# Patient Record
Sex: MALE | Race: WHITE | NOT HISPANIC OR LATINO | Employment: FULL TIME | URBAN - METROPOLITAN AREA
[De-identification: names, ages, dates, MRNs, and addresses within clinical notes are randomized per-mention and may not be internally consistent; named-entity substitution may affect disease eponyms.]

---

## 2024-01-03 PROBLEM — E78.5 BORDERLINE HYPERLIPIDEMIA: Status: ACTIVE | Noted: 2024-01-03

## 2024-01-03 PROBLEM — G47.33 OSA ON CPAP: Status: ACTIVE | Noted: 2024-01-03

## 2024-01-03 PROBLEM — K76.0 FATTY INFILTRATION OF LIVER: Status: ACTIVE | Noted: 2024-01-03

## 2024-01-03 PROBLEM — J30.9 ALLERGIC RHINITIS: Status: ACTIVE | Noted: 2024-01-03

## 2024-01-03 PROBLEM — E66.01 CLASS 2 SEVERE OBESITY WITH BODY MASS INDEX (BMI) OF 35 TO 39.9 WITH SERIOUS COMORBIDITY (MULTI): Status: ACTIVE | Noted: 2024-01-03

## 2024-01-03 PROBLEM — K21.9 GERD (GASTROESOPHAGEAL REFLUX DISEASE): Status: ACTIVE | Noted: 2024-01-03

## 2024-01-03 PROBLEM — F41.9 ANXIETY: Status: ACTIVE | Noted: 2024-01-03

## 2024-01-03 PROBLEM — G56.20 ULNAR NEUROPATHY: Status: ACTIVE | Noted: 2024-01-03

## 2024-01-03 PROBLEM — K22.10 ESOPHAGEAL ULCER: Status: ACTIVE | Noted: 2024-01-03

## 2024-01-03 PROBLEM — E66.812 CLASS 2 SEVERE OBESITY WITH BODY MASS INDEX (BMI) OF 35 TO 39.9 WITH SERIOUS COMORBIDITY: Status: ACTIVE | Noted: 2024-01-03

## 2024-01-03 RX ORDER — OMEPRAZOLE 20 MG/1
CAPSULE, DELAYED RELEASE ORAL
COMMUNITY
Start: 2017-12-18

## 2024-01-03 RX ORDER — FAMOTIDINE 20 MG/1
1 TABLET, FILM COATED ORAL NIGHTLY
COMMUNITY
Start: 2022-01-24 | End: 2024-03-14 | Stop reason: WASHOUT

## 2024-01-03 RX ORDER — CITALOPRAM 20 MG/1
1 TABLET, FILM COATED ORAL DAILY
COMMUNITY
Start: 2014-11-20

## 2024-01-11 ENCOUNTER — OFFICE VISIT (OUTPATIENT)
Dept: PRIMARY CARE | Facility: CLINIC | Age: 54
End: 2024-01-11
Payer: COMMERCIAL

## 2024-01-11 ENCOUNTER — LAB (OUTPATIENT)
Dept: LAB | Facility: LAB | Age: 54
End: 2024-01-11
Payer: COMMERCIAL

## 2024-01-11 VITALS
HEIGHT: 71 IN | RESPIRATION RATE: 16 BRPM | SYSTOLIC BLOOD PRESSURE: 124 MMHG | DIASTOLIC BLOOD PRESSURE: 78 MMHG | TEMPERATURE: 97.7 F | HEART RATE: 70 BPM | WEIGHT: 270.2 LBS | OXYGEN SATURATION: 96 % | BODY MASS INDEX: 37.83 KG/M2

## 2024-01-11 DIAGNOSIS — K76.0 FATTY INFILTRATION OF LIVER: ICD-10-CM

## 2024-01-11 DIAGNOSIS — Z12.5 SCREENING FOR PROSTATE CANCER: ICD-10-CM

## 2024-01-11 DIAGNOSIS — G47.33 OSA ON CPAP: ICD-10-CM

## 2024-01-11 DIAGNOSIS — Z23 NEED FOR TDAP VACCINATION: Primary | ICD-10-CM

## 2024-01-11 DIAGNOSIS — K21.9 GASTROESOPHAGEAL REFLUX DISEASE WITHOUT ESOPHAGITIS: ICD-10-CM

## 2024-01-11 DIAGNOSIS — E66.01 CLASS 2 SEVERE OBESITY WITH BODY MASS INDEX (BMI) OF 35 TO 39.9 WITH SERIOUS COMORBIDITY (MULTI): ICD-10-CM

## 2024-01-11 DIAGNOSIS — F41.9 ANXIETY: ICD-10-CM

## 2024-01-11 DIAGNOSIS — Z00.00 HEALTHCARE MAINTENANCE: ICD-10-CM

## 2024-01-11 DIAGNOSIS — R79.89 ELEVATED LIVER FUNCTION TESTS: ICD-10-CM

## 2024-01-11 LAB
ALBUMIN SERPL BCP-MCNC: 4.4 G/DL (ref 3.4–5)
ALP SERPL-CCNC: 43 U/L (ref 33–120)
ALT SERPL W P-5'-P-CCNC: 82 U/L (ref 10–52)
ANION GAP SERPL CALC-SCNC: 10 MMOL/L (ref 10–20)
APPEARANCE UR: CLEAR
AST SERPL W P-5'-P-CCNC: 50 U/L (ref 9–39)
BILIRUB SERPL-MCNC: 0.8 MG/DL (ref 0–1.2)
BILIRUB UR STRIP.AUTO-MCNC: NEGATIVE MG/DL
BUN SERPL-MCNC: 16 MG/DL (ref 6–23)
CALCIUM SERPL-MCNC: 9.3 MG/DL (ref 8.6–10.3)
CHLORIDE SERPL-SCNC: 103 MMOL/L (ref 98–107)
CHOLEST SERPL-MCNC: 176 MG/DL (ref 0–199)
CHOLESTEROL/HDL RATIO: 4.3
CO2 SERPL-SCNC: 30 MMOL/L (ref 21–32)
COLOR UR: YELLOW
CREAT SERPL-MCNC: 0.94 MG/DL (ref 0.5–1.3)
EGFRCR SERPLBLD CKD-EPI 2021: >90 ML/MIN/1.73M*2
ERYTHROCYTE [DISTWIDTH] IN BLOOD BY AUTOMATED COUNT: 13.1 % (ref 11.5–14.5)
GLUCOSE SERPL-MCNC: 103 MG/DL (ref 74–99)
GLUCOSE UR STRIP.AUTO-MCNC: NEGATIVE MG/DL
HCT VFR BLD AUTO: 43.7 % (ref 41–52)
HDLC SERPL-MCNC: 40.9 MG/DL
HGB BLD-MCNC: 14.5 G/DL (ref 13.5–17.5)
KETONES UR STRIP.AUTO-MCNC: NEGATIVE MG/DL
LDLC SERPL CALC-MCNC: 109 MG/DL
LEUKOCYTE ESTERASE UR QL STRIP.AUTO: NEGATIVE
MCH RBC QN AUTO: 29.6 PG (ref 26–34)
MCHC RBC AUTO-ENTMCNC: 33.2 G/DL (ref 32–36)
MCV RBC AUTO: 89 FL (ref 80–100)
NITRITE UR QL STRIP.AUTO: NEGATIVE
NON HDL CHOLESTEROL: 135 MG/DL (ref 0–149)
NRBC BLD-RTO: 0 /100 WBCS (ref 0–0)
PH UR STRIP.AUTO: 6 [PH]
PLATELET # BLD AUTO: 197 X10*3/UL (ref 150–450)
POTASSIUM SERPL-SCNC: 4.2 MMOL/L (ref 3.5–5.3)
PROT SERPL-MCNC: 7.1 G/DL (ref 6.4–8.2)
PROT UR STRIP.AUTO-MCNC: NEGATIVE MG/DL
PSA SERPL-MCNC: 0.24 NG/ML
RBC # BLD AUTO: 4.9 X10*6/UL (ref 4.5–5.9)
RBC # UR STRIP.AUTO: NEGATIVE /UL
SODIUM SERPL-SCNC: 139 MMOL/L (ref 136–145)
SP GR UR STRIP.AUTO: 1.03
TRIGL SERPL-MCNC: 132 MG/DL (ref 0–149)
TSH SERPL-ACNC: 0.99 MIU/L (ref 0.44–3.98)
UROBILINOGEN UR STRIP.AUTO-MCNC: <2 MG/DL
VLDL: 26 MG/DL (ref 0–40)
WBC # BLD AUTO: 4.6 X10*3/UL (ref 4.4–11.3)

## 2024-01-11 PROCEDURE — 81003 URINALYSIS AUTO W/O SCOPE: CPT

## 2024-01-11 PROCEDURE — 85027 COMPLETE CBC AUTOMATED: CPT

## 2024-01-11 PROCEDURE — 80061 LIPID PANEL: CPT

## 2024-01-11 PROCEDURE — 90715 TDAP VACCINE 7 YRS/> IM: CPT | Performed by: INTERNAL MEDICINE

## 2024-01-11 PROCEDURE — 84443 ASSAY THYROID STIM HORMONE: CPT

## 2024-01-11 PROCEDURE — 90471 IMMUNIZATION ADMIN: CPT | Performed by: INTERNAL MEDICINE

## 2024-01-11 PROCEDURE — 84153 ASSAY OF PSA TOTAL: CPT

## 2024-01-11 PROCEDURE — 80053 COMPREHEN METABOLIC PANEL: CPT

## 2024-01-11 PROCEDURE — 36415 COLL VENOUS BLD VENIPUNCTURE: CPT

## 2024-01-11 PROCEDURE — 99396 PREV VISIT EST AGE 40-64: CPT | Performed by: INTERNAL MEDICINE

## 2024-01-11 PROCEDURE — 1036F TOBACCO NON-USER: CPT | Performed by: INTERNAL MEDICINE

## 2024-01-11 ASSESSMENT — ENCOUNTER SYMPTOMS
VOICE CHANGE: 0
RESPIRATORY NEGATIVE: 1
SEIZURES: 0
ARTHRALGIAS: 0
NECK STIFFNESS: 0
BACK PAIN: 0
HALLUCINATIONS: 0
HEMATOLOGIC/LYMPHATIC NEGATIVE: 1
EYE PAIN: 0
ALLERGIC/IMMUNOLOGIC NEGATIVE: 1
MYALGIAS: 0
SHORTNESS OF BREATH: 0
ABDOMINAL PAIN: 0
WOUND: 0
WEAKNESS: 0
WHEEZING: 0
POLYDIPSIA: 0
SPEECH DIFFICULTY: 0
ENDOCRINE NEGATIVE: 1
POLYPHAGIA: 0
DIZZINESS: 0
COLOR CHANGE: 0
ADENOPATHY: 0
FACIAL ASYMMETRY: 0
EYE DISCHARGE: 0
CONSTITUTIONAL NEGATIVE: 1
EYES NEGATIVE: 1
CARDIOVASCULAR NEGATIVE: 1
EYE REDNESS: 0
MUSCULOSKELETAL NEGATIVE: 1
NECK PAIN: 0
LIGHT-HEADEDNESS: 0
OCCASIONAL FEELINGS OF UNSTEADINESS: 0
VOMITING: 0
HEADACHES: 0
AGITATION: 0
DIARRHEA: 0
BLOOD IN STOOL: 0
PSYCHIATRIC NEGATIVE: 1
ACTIVITY CHANGE: 0
SINUS PRESSURE: 0
CONFUSION: 0
CHEST TIGHTNESS: 0
APPETITE CHANGE: 0
DIFFICULTY URINATING: 0
DYSURIA: 0
SORE THROAT: 0
CONSTIPATION: 0
LOSS OF SENSATION IN FEET: 0
NAUSEA: 0
SINUS PAIN: 0
SLEEP DISTURBANCE: 0
PALPITATIONS: 0
TREMORS: 0
JOINT SWELLING: 0
NEUROLOGICAL NEGATIVE: 1
DEPRESSION: 0
GASTROINTESTINAL NEGATIVE: 1
NUMBNESS: 0
FREQUENCY: 0
FLANK PAIN: 0
TROUBLE SWALLOWING: 0
BRUISES/BLEEDS EASILY: 0
STRIDOR: 0
UNEXPECTED WEIGHT CHANGE: 0
COUGH: 0
NERVOUS/ANXIOUS: 0

## 2024-01-11 ASSESSMENT — PATIENT HEALTH QUESTIONNAIRE - PHQ9
SUM OF ALL RESPONSES TO PHQ9 QUESTIONS 1 AND 2: 0
1. LITTLE INTEREST OR PLEASURE IN DOING THINGS: NOT AT ALL
2. FEELING DOWN, DEPRESSED OR HOPELESS: NOT AT ALL

## 2024-01-11 NOTE — PROGRESS NOTES
Subjective   Patient ID: Barrera Ruiz is a 53 y.o. male who presents for Annual Exam (Patient is here for a physical.).  HPI    Patient has been feeling pretty good and has been complaint with prescribed medications.    We reviewed and discussed details of recent blood work: CBC, CMP, TSH, Lipid panel, Hb A1c, Vit D, PSA done in 2023.  Results within acceptable range except elevated liver enzymes and borderline elevated  LDL..    Patient was previously diagnosed with NAFLD, saw hepatologist Dr. Vizcarra.  Last US abdomen was in 2016.    He has been using CPAP machine, received new one I 2023, follows with sleep medicine.  Anxiety sx controlled with citalopram  GERD sx controlled with omeprazole    Colonoscopy: 2020, next 2030      Review of Systems   Constitutional: Negative.  Negative for activity change, appetite change and unexpected weight change.   HENT: Negative.  Negative for congestion, ear discharge, ear pain, hearing loss, mouth sores, nosebleeds, sinus pressure, sinus pain, sore throat, trouble swallowing and voice change.    Eyes: Negative.  Negative for pain, discharge, redness and visual disturbance.   Respiratory: Negative.  Negative for cough, chest tightness, shortness of breath, wheezing and stridor.    Cardiovascular: Negative.  Negative for chest pain, palpitations and leg swelling.   Gastrointestinal: Negative.  Negative for abdominal pain, blood in stool, constipation, diarrhea, nausea and vomiting.   Endocrine: Negative.  Negative for polydipsia, polyphagia and polyuria.   Genitourinary: Negative.  Negative for difficulty urinating, dysuria, flank pain, frequency and urgency.   Musculoskeletal: Negative.  Negative for arthralgias, back pain, gait problem, joint swelling, myalgias, neck pain and neck stiffness.   Skin: Negative.  Negative for color change, rash and wound.   Allergic/Immunologic: Negative.  Negative for environmental allergies, food allergies and immunocompromised state.  "  Neurological: Negative.  Negative for dizziness, tremors, seizures, syncope, facial asymmetry, speech difficulty, weakness, light-headedness, numbness and headaches.   Hematological: Negative.  Negative for adenopathy. Does not bruise/bleed easily.   Psychiatric/Behavioral: Negative.  Negative for agitation, behavioral problems, confusion, hallucinations, sleep disturbance and suicidal ideas. The patient is not nervous/anxious.    All other systems reviewed and are negative.      Objective     Review of systems was performed and all systems were negative except what in HPI    /78   Pulse 70   Temp 36.5 °C (97.7 °F)   Resp 16   Ht 1.803 m (5' 11\")   Wt 123 kg (270 lb 3.2 oz)   SpO2 96%   BMI 37.69 kg/m²    Physical Exam  Vitals and nursing note reviewed.   Constitutional:       General: He is not in acute distress.     Appearance: Normal appearance.   HENT:      Head: Normocephalic and atraumatic.      Right Ear: Tympanic membrane, ear canal and external ear normal.      Left Ear: Tympanic membrane, ear canal and external ear normal.      Nose: Nose normal. No congestion or rhinorrhea.      Mouth/Throat:      Mouth: Mucous membranes are moist.      Pharynx: Oropharynx is clear.   Eyes:      General:         Right eye: No discharge.         Left eye: No discharge.      Extraocular Movements: Extraocular movements intact.      Conjunctiva/sclera: Conjunctivae normal.      Pupils: Pupils are equal, round, and reactive to light.   Cardiovascular:      Rate and Rhythm: Normal rate and regular rhythm.      Pulses: Normal pulses.      Heart sounds: Normal heart sounds. No murmur heard.     No friction rub. No gallop.   Pulmonary:      Effort: Pulmonary effort is normal. No respiratory distress.      Breath sounds: Normal breath sounds. No stridor. No wheezing, rhonchi or rales.   Chest:      Chest wall: No tenderness.   Abdominal:      General: Bowel sounds are normal.      Palpations: Abdomen is soft. There " is no mass.      Tenderness: There is no abdominal tenderness. There is no guarding or rebound.   Musculoskeletal:         General: No swelling or deformity. Normal range of motion.      Cervical back: Normal range of motion and neck supple. No rigidity or tenderness.      Right lower leg: No edema.      Left lower leg: No edema.   Lymphadenopathy:      Cervical: No cervical adenopathy.   Skin:     General: Skin is warm and dry.      Coloration: Skin is not jaundiced.      Findings: No bruising or erythema.   Neurological:      General: No focal deficit present.      Mental Status: He is alert and oriented to person, place, and time. Mental status is at baseline.      Cranial Nerves: No cranial nerve deficit.      Motor: No weakness.      Coordination: Coordination normal.      Gait: Gait normal.   Psychiatric:         Mood and Affect: Mood normal.         Behavior: Behavior normal.         Thought Content: Thought content normal.         Judgment: Judgment normal.         Assessment/Plan   Problem List Items Addressed This Visit             ICD-10-CM       Endocrine/Metabolic    Class 2 severe obesity with body mass index (BMI) of 35 to 39.9 with serious comorbidity (CMS/HCC) E66.01     Weight loss is advised. Target BMI: 25. Please follow low carbohydrate diet and exercise at least 150 minutes weekly.  Nutritional consultation is available, please let me know if interested.             Gastrointestinal and Abdominal    Fatty infiltration of liver K76.0     Weight loss is advised. Obtain blood work, US liver, F/up with your hepatologist Dr. Vizcarra.         GERD (gastroesophageal reflux disease) K21.9     Clinically stable. Continue Omeprazole.         Elevated liver function tests R79.89    Relevant Orders    US abdomen limited liver       Health Encounters    Healthcare maintenance Z00.00    Relevant Orders    CBC (Completed)    Comprehensive Metabolic Panel (Completed)    Lipid Panel (Completed)    Prostate  Specific Antigen, Screen (Completed)    TSH with reflex to Free T4 if abnormal (Completed)    Urinalysis with Reflex Microscopic (Completed)       Mental Health    Anxiety F41.9     Clinically stable. Continue current treatment.            Sleep    BARBI on CPAP G47.33     Clinically stable. Continue CPAP. F/up with sleep medicine.          Other Visit Diagnoses         Codes    Need for Tdap vaccination    -  Primary Z23    Relevant Orders    Tdap vaccine, age 7 years and older (Completed)    Screening for prostate cancer     Z12.5    Relevant Orders    Prostate Specific Antigen, Screen (Completed)        It was a pleasure to see you today.  I would like to remind you about importance of a healthy lifestyle in order to improve your well-being and live longer.  Try to engage in physical activities for at least 150 minutes per week.  Eat about 10 servings of fruits and vegetables daily. My advice is 2 servings of fruits and 8 servings of vegetables.  For vegetables choose at least half of them green and at least half of them fresh.  Please avoid sugar, salt, fried food and saturated fat.    F/up in 1 year or sooner if needed.

## 2024-01-17 ENCOUNTER — ANCILLARY PROCEDURE (OUTPATIENT)
Dept: RADIOLOGY | Facility: CLINIC | Age: 54
End: 2024-01-17
Payer: COMMERCIAL

## 2024-01-17 DIAGNOSIS — R79.89 ELEVATED LIVER FUNCTION TESTS: ICD-10-CM

## 2024-01-17 PROCEDURE — 76705 ECHO EXAM OF ABDOMEN: CPT | Performed by: RADIOLOGY

## 2024-01-17 PROCEDURE — 76705 ECHO EXAM OF ABDOMEN: CPT

## 2024-03-12 ENCOUNTER — OFFICE VISIT (OUTPATIENT)
Dept: GASTROENTEROLOGY | Facility: CLINIC | Age: 54
End: 2024-03-12
Payer: COMMERCIAL

## 2024-03-12 VITALS
WEIGHT: 277 LBS | TEMPERATURE: 98.1 F | BODY MASS INDEX: 38.63 KG/M2 | SYSTOLIC BLOOD PRESSURE: 126 MMHG | DIASTOLIC BLOOD PRESSURE: 75 MMHG | HEART RATE: 84 BPM

## 2024-03-12 DIAGNOSIS — R79.89 ELEVATED LIVER FUNCTION TESTS: ICD-10-CM

## 2024-03-12 DIAGNOSIS — K76.0 FATTY INFILTRATION OF LIVER: ICD-10-CM

## 2024-03-12 PROCEDURE — 1036F TOBACCO NON-USER: CPT | Performed by: INTERNAL MEDICINE

## 2024-03-12 PROCEDURE — 99214 OFFICE O/P EST MOD 30 MIN: CPT | Performed by: INTERNAL MEDICINE

## 2024-03-12 ASSESSMENT — PAIN SCALES - GENERAL: PAINLEVEL: 0-NO PAIN

## 2024-03-12 NOTE — PATIENT INSTRUCTIONS
[x]  Liver Elastography aka Fibroscan  Scheduling [phone] (Department Gastro)  Type of liver elastography.  A special ultrasound that measures scarring (fibrosis) and fat (steatosis) in the liver.   The Fibroscan is located at the following locations: [facility], [facility], [facility] (This test is not done in radiology)  YOU SHOULD NOT EAT OR DRINK 3 HOURS BEFORE THE EXAM.       At this time there are no medications to treat NAFLD, the treatment is related to the cause.  Lifestyle changes can help prevent damage and may reverse it in the early stages.  First line of treatment for overweight or obese person is weight loss, that is gradual and sustained.  Recommendation is 5-10% over 6-12 months. Physical activity is important in treating fatty liver. Further recommendation is regular exercise that includes resistance training.  This is an effective treatment (weight training/gym training) to help mobilize fat out of the liver.  Good nutrition that includes low sugar fresh fruits and low starch vegetables, and using healthy oils/fats for cooking.  Eat a lower carb diet by decreasing intake of added sugar, sugary beverages, high fructose corn syrup, and as much as possible avoiding foods made with white flour.  You should limit alcohol intake in NAFLD.  Keep cholesterol and blood sugar under control in those with diabetes and high cholesterol.  Eating healthy and exercising regularly may help prevent liver damage from starting or reverse it in the early stages.    What are Carbohydrates?  Come in a variety of forms, most common are sugar, fiber, and starches  Healthy carbohydrates: unprocessed or minimally processed foods, vegetables, some fruits, and beans  Unhealthy carbohydrates: sugar, white bread, pastries, soda, highly processed or refined foods.     Examples of Some Foods High in Carbohydrates  High starch foods  Grains (rice ,wheat, barley)  Corn  Bread, pasta, cereal  Potatoes and root  vegetables  Soda  Chips    Example of Some Fruits High in Sugar  Mangoes  Grapes  Cherries  Pears  Figs  Watermelon   Bananas  Candied fruit  Fruit juices  Canned Fruit  Dried fruits  Mangoes        If you have any questions or need assistance, please don't hesitate to contact us.   Dr. Vizcarra: Office [phone] Fax: [phone]  Hepatology Nurse Coordinator: Luda SANTOS [phone]

## 2024-03-14 ASSESSMENT — ENCOUNTER SYMPTOMS
FEVER: 0
RECTAL PAIN: 0
TROUBLE SWALLOWING: 0
NAUSEA: 0
BLOOD IN STOOL: 0
ABDOMINAL PAIN: 0
DIARRHEA: 0
COLOR CHANGE: 0
ANAL BLEEDING: 0
SHORTNESS OF BREATH: 0
CONSTIPATION: 0
UNEXPECTED WEIGHT CHANGE: 0
ABDOMINAL DISTENTION: 0
CHILLS: 0
VOMITING: 0

## 2024-03-14 NOTE — ASSESSMENT & PLAN NOTE
It is reasonable to repeat fibroscan at this point.  This patient has Metabolic Dysfunction Associated Steatotic Liver Disease (MASLD) without advanced fibrosis  diet and exercise were encouraged  Dietary and lifestyle interventions were recommended as follows:    Perform vigorous physical activity like brisk walking or structured gym exercises 3 times a week for 30 minutes or more frequently  Avoid foods which contain complex sugars like wheat, rice, potatoes and corn.  Avoid eating foods that are rich in sugar in excess such as high sugar content fruits (pineapple, emily...) and desserts, cakes and pies  Eat more good foods that are rich in good fat such as cold water fish (salmon, swordfish...) as well as avocados and peanut butter in moderation  Snack on nuts that are high in protein and good oils such as walnuts, almonds.   Eat a mediteranean diet which is rich in grilled lean meats, high protein beans and legumes and olive oil     Exercise for 30 minutes or more at least 3 times a week  focus on exercises that build muscle mass like working with weights and lifting. Try swimming or water aerobics if you have access to a pool    Aim to lose 7-10% of your total body weight over 6-12 months

## 2024-03-14 NOTE — PROGRESS NOTES
Subjective  He was seen in 2020 for elevated ALT and steatosis on imaging. Fibroscan showed F0-F1 fibrosis and severe steatosis. Not much has changed since then and he denies fluid overload or cognitive impairment.     Review of Systems   Constitutional:  Negative for chills, fever and unexpected weight change.   HENT:  Negative for trouble swallowing.    Respiratory:  Negative for shortness of breath.    Cardiovascular:  Negative for chest pain.   Gastrointestinal:  Negative for abdominal distention, abdominal pain, anal bleeding, blood in stool, constipation, diarrhea, nausea, rectal pain and vomiting.   Skin:  Negative for color change.       Physical Exam  Vitals reviewed.   Constitutional:       General: He is awake.      Appearance: Normal appearance.   HENT:      Head: Normocephalic and atraumatic.      Nose: Nose normal.      Mouth/Throat:      Mouth: Mucous membranes are moist.   Eyes:      Pupils: Pupils are equal, round, and reactive to light.   Cardiovascular:      Rate and Rhythm: Normal rate.   Pulmonary:      Effort: Pulmonary effort is normal.   Neurological:      Mental Status: He is alert and oriented to person, place, and time. Mental status is at baseline.   Psychiatric:         Attention and Perception: Attention and perception normal.         Mood and Affect: Mood normal.         Behavior: Behavior normal.     LABS:   Lab Results   Component Value Date    ALBUMIN 4.4 01/11/2024    BILITOT 0.8 01/11/2024    BILIDIR 0.1 01/21/2020    ALKPHOS 43 01/11/2024    ALT 82 (H) 01/11/2024    AST 50 (H) 01/11/2024    PROT 7.1 01/11/2024    [id number] 134 01/21/2020    MITOAB NEGATIVE 01/21/2020    SMOOTHMUSCAB NEGATIVE 01/21/2020    CERULOPLSM 23 01/21/2020    HAVTO NON-REACTIVE 01/21/2020    HEPBSAB < 3.1 01/21/2020    HEPBCAB NONREACTIVE 01/21/2020    HEPBSAG NONREACTIVE 01/21/2020    HEPCAB NON-REACTIVE 01/21/2020    INR 1.1 01/21/2020    FERRITIN 91 01/21/2020    IRON 124 01/21/2020    IRONSAT 33  "01/21/2020      Lab Results   Component Value Date    ALBUMIN 4.4 01/11/2024    BILITOT 0.8 01/11/2024    BILIDIR 0.1 01/21/2020    ALKPHOS 43 01/11/2024    ALT 82 (H) 01/11/2024    AST 50 (H) 01/11/2024    PROT 7.1 01/11/2024      Lab Results   Component Value Date    GLUCOSE 103 (H) 01/11/2024    CALCIUM 9.3 01/11/2024     01/11/2024    K 4.2 01/11/2024    CO2 30 01/11/2024     01/11/2024    BUN 16 01/11/2024    CREATININE 0.94 01/11/2024     Lab Results   Component Value Date    WBC 4.6 01/11/2024    HGB 14.5 01/11/2024    HCT 43.7 01/11/2024    MCV 89 01/11/2024     01/11/2024     Lab Results   Component Value Date    INR 1.1 01/21/2020      Lab Results   Component Value Date     01/11/2024    CREATININE 0.94 01/11/2024    BILITOT 0.8 01/11/2024    INR 1.1 01/21/2020     No results found for: \"AFP\"   Lab Results   Component Value Date    FERRITIN 91 01/21/2020    IRON 124 01/21/2020    IRONSAT 33 01/21/2020      Lab Results   Component Value Date    HEPCAB NON-REACTIVE 01/21/2020     Lab Results   Component Value Date    HEPBSAG NONREACTIVE 01/21/2020    HEPBSAB < 3.1 01/21/2020    HEPBCAB NONREACTIVE 01/21/2020      No results found for: \"SJUP869\", \"FZXS202\"     === 01/17/24 ===    US ABDOMEN LIMITED LIVER    - Impression -  Echogenic fatty infiltrated liver.    No gallstones.    MACRO:  None    Signed by: Wilian Ellis 1/17/2024 1:39 PM  Dictation workstation:   [id number]                 Fatty infiltration of liver  It is reasonable to repeat fibroscan at this point.  This patient has Metabolic Dysfunction Associated Steatotic Liver Disease (MASLD) without advanced fibrosis  diet and exercise were encouraged  Dietary and lifestyle interventions were recommended as follows:    Perform vigorous physical activity like brisk walking or structured gym exercises 3 times a week for 30 minutes or more frequently  Avoid foods which contain complex sugars like wheat, rice, potatoes and " corn.  Avoid eating foods that are rich in sugar in excess such as high sugar content fruits (pineapple, emily...) and desserts, cakes and pies  Eat more good foods that are rich in good fat such as cold water fish (salmon, swordfish...) as well as avocados and peanut butter in moderation  Snack on nuts that are high in protein and good oils such as walnuts, almonds.   Eat a mediteranean diet which is rich in grilled lean meats, high protein beans and legumes and olive oil     Exercise for 30 minutes or more at least 3 times a week  focus on exercises that build muscle mass like working with weights and lifting. Try swimming or water aerobics if you have access to a pool    Aim to lose 7-10% of your total body weight over 6-12 months

## 2024-04-12 ENCOUNTER — CLINICAL SUPPORT (OUTPATIENT)
Dept: GASTROENTEROLOGY | Facility: HOSPITAL | Age: 54
End: 2024-04-12
Payer: COMMERCIAL

## 2024-04-12 DIAGNOSIS — R79.89 ELEVATED LIVER FUNCTION TESTS: ICD-10-CM

## 2024-04-12 DIAGNOSIS — K76.0 FATTY INFILTRATION OF LIVER: ICD-10-CM

## 2024-04-12 PROCEDURE — 91200 LIVER ELASTOGRAPHY: CPT | Performed by: INTERNAL MEDICINE

## 2024-04-16 ENCOUNTER — TELEPHONE (OUTPATIENT)
Dept: GASTROENTEROLOGY | Facility: CLINIC | Age: 54
End: 2024-04-16
Payer: COMMERCIAL

## 2024-04-17 ENCOUNTER — DOCUMENTATION (OUTPATIENT)
Dept: GASTROENTEROLOGY | Facility: HOSPITAL | Age: 54
End: 2024-04-17
Payer: COMMERCIAL

## 2024-06-13 DIAGNOSIS — K21.9 GASTROESOPHAGEAL REFLUX DISEASE WITHOUT ESOPHAGITIS: ICD-10-CM

## 2024-06-13 DIAGNOSIS — F41.9 ANXIETY: Primary | ICD-10-CM

## 2024-06-13 RX ORDER — CITALOPRAM 20 MG/1
20 TABLET, FILM COATED ORAL DAILY
Qty: 90 TABLET | Refills: 2 | Status: SHIPPED | OUTPATIENT
Start: 2024-06-13

## 2024-06-13 RX ORDER — OMEPRAZOLE 20 MG/1
20 CAPSULE, DELAYED RELEASE ORAL
Qty: 90 CAPSULE | Refills: 2 | Status: SHIPPED | OUTPATIENT
Start: 2024-06-13

## 2024-06-20 ENCOUNTER — APPOINTMENT (OUTPATIENT)
Dept: SLEEP MEDICINE | Facility: CLINIC | Age: 54
End: 2024-06-20
Payer: COMMERCIAL

## 2024-08-16 DIAGNOSIS — K40.90 INGUINAL HERNIA WITHOUT OBSTRUCTION OR GANGRENE, RECURRENCE NOT SPECIFIED, UNSPECIFIED LATERALITY: Primary | ICD-10-CM

## 2024-11-16 ENCOUNTER — OFFICE VISIT (OUTPATIENT)
Dept: URGENT CARE | Age: 54
End: 2024-11-16
Payer: COMMERCIAL

## 2024-11-16 VITALS
SYSTOLIC BLOOD PRESSURE: 125 MMHG | TEMPERATURE: 98.1 F | RESPIRATION RATE: 16 BRPM | DIASTOLIC BLOOD PRESSURE: 76 MMHG | OXYGEN SATURATION: 97 % | HEART RATE: 79 BPM

## 2024-11-16 DIAGNOSIS — J98.8 RESPIRATORY TRACT INFECTION: Primary | ICD-10-CM

## 2024-11-16 DIAGNOSIS — R05.1 ACUTE COUGH: ICD-10-CM

## 2024-11-16 RX ORDER — ALBUTEROL SULFATE 90 UG/1
2 INHALANT RESPIRATORY (INHALATION) EVERY 4 HOURS PRN
Qty: 8 G | Refills: 0 | Status: SHIPPED | OUTPATIENT
Start: 2024-11-16 | End: 2025-11-16

## 2024-11-16 RX ORDER — PREDNISONE 20 MG/1
40 TABLET ORAL DAILY
Qty: 10 TABLET | Refills: 0 | Status: SHIPPED | OUTPATIENT
Start: 2024-11-16 | End: 2024-11-21

## 2024-11-16 RX ORDER — BENZONATATE 100 MG/1
100 CAPSULE ORAL 3 TIMES DAILY PRN
Qty: 20 CAPSULE | Refills: 0 | Status: SHIPPED | OUTPATIENT
Start: 2024-11-16 | End: 2024-11-23

## 2024-11-16 RX ORDER — AZITHROMYCIN 250 MG/1
TABLET, FILM COATED ORAL
Qty: 6 TABLET | Refills: 0 | Status: SHIPPED | OUTPATIENT
Start: 2024-11-16 | End: 2024-11-21

## 2024-11-16 NOTE — PATIENT INSTRUCTIONS
You were seen for cold like symptoms.  You most likely have a upper respiratory tract infection.  I recommend supportive therapy with the following medications below.    URI  1.  Please take antibiotic as prescribed  2.  Use Tea and honey for cough. This is known to work better than most OTC medications.  3.  Use Mucinex to break up congestion.   4.  Stay well-hydrated and drink lots of fluids.  5.  Follow up with your primary care physician in the next few days for reevaluation, especially if symptoms are not improving  6.  Return to the urgent care or emergency department if symptoms worsen or new symptoms develop.    Based on clinical exam findings and history you most likely have a upper respiratory tract infection.  Your initial illness was likely caused by a virus that progressed to a secondary bacterial infection.  You are contagious as soon as the symptoms start.  Your symptoms may persist up to 2-3 weeks.  Symptoms usually peak by days 3 or 5.  Typical symptoms include nasal congestion, a runny nose, scratchy throat, cough, and irritability. The diagnosis is based on symptoms. Good hand hygiene is the best way to prevent these infections, and routine vaccination can help prevent influenza. Treatment aims to relieve symptoms. Rest and fluids. Tylenol and/or ibuprofen for fever and pain. Over the counter decongestants or mucolytics.

## 2024-11-16 NOTE — PROGRESS NOTES
Subjective   Patient ID: Barrera Ruiz is a 54 y.o. male. They present today with a chief complaint of Cough.    CC: URI symptoms    HPI: Patient presenting for URI symptoms x 2 weeks..  Symptoms include nasal drip and a dry cough.  No fever, chills, myalgias, abdominal pain, nausea, vomiting, diarrhea, rash.  No chest pain or shortness of breath.  No history of clots or clotting disorders.  No lower extremity swelling edema or pain.  No recent travel or surgeries.    Past Medical History  Allergies as of 11/16/2024    (No Known Allergies)       (Not in a hospital admission)         Past Medical History:   Diagnosis Date    Allergic rhinitis, unspecified 08/28/2014    Allergic rhinitis    Encounter for screening for malignant neoplasm of colon     Colon cancer screening    Fatty (change of) liver, not elsewhere classified 01/31/2017    Fatty infiltration of liver    Personal history of other mental and behavioral disorders 08/28/2014    History of depression       Past Surgical History:   Procedure Laterality Date    OTHER SURGICAL HISTORY  08/28/2014    Open Treatment Of A Fracture Of The Calcaneus        reports that he has never smoked. He has never been exposed to tobacco smoke. He has never used smokeless tobacco. He reports that he does not currently use alcohol. He reports that he does not use drugs.    Review of Systems  Review of Systems      After reviewing all body systems I have documented pertinent findings above in the history.  All other Systems reviewed and are negative for complaint.  Pertinent positive and negatives are listed in the above HPI.        Objective    Vitals:    11/16/24 1007   BP: 125/76   Pulse: 79   Resp: 16   Temp: 36.7 °C (98.1 °F)   SpO2: 97%     No LMP for male patient.    Physical Exam    General: Alert, oriented, and cooperative.  No acute distress. Well developed, well nourished.     Skin: Skin is warm, and dry. No rashes or lesions.    Eyes: Sclera and conjunctivae normal      Ears: TM´s are intact, pink/grey, with mild effusions bilaterally.  No significant erythema.  No hemotympanum or rupture. Bilateral auricle, helix, and tragus without inflammation or erythema.  No mastoid erythema, or tenderness.  No deformities. Right and left canal negative for erythema, or discharge.  Hearing is grossly intact bilaterally    Mouth/Throat: Pharynx is erythematous.  Tonsils are equal in size.  No exudates.  No angioedema of the lips or tongue.  No respiratory compromise, tripoding, drooling, muffled voice,  stridor, or trismus.     Neck: Supple.     Cardiac: Regular rate and rhythm    Respiratory:  No acute respiratory distress.  Regular rate of breathing.  No accessory muscle use.  No tripoding.  Lungs are clear bilaterally.    Abdomen: Soft, nontender.    Musculoskeletal: Upper and lower extremities are atraumatic in appearance without deformity, erythema, edema, and atrophy. No crepitus, or tenderness. Compartments are all soft.      Procedures    Point of Care Test & Imaging Results from this visit    No results found.    Diagnostic study results (if any) were reviewed by Zackery Pickering PA-C.    Assessment/Plan   Allergies, medications, history, and pertinent labs/EKGs/Imaging reviewed by Zackery Pickering PA-C.     MDM:  Patient presenting for URI type symptoms.  No relief despite taking over-the-counter medications.  Patient overall looks well.  Speaks in complete sentences.  No evidence of acute distress or respiratory compromise.  No tripoding. No nasal flaring. No clinical signs or history to suggest meningitis, Lamont´s, peritonsillar abscess, epiglottitis, pneumonia, or asthma.  Due length and worsening of  symptoms a bacterial respiratory tract infection is considered the most likely cause.  Through shared decision making the patient was prescribed an antimicrobial as a treatment measure.  Advised supportive therapy with over the counter medication and follow-up with a PCP in the  next few days. Pt/family instructed to return to the urgent care or emergency department if symptoms worsen or if new symptoms develop. Patient/family expressed understanding and consented to the above plan. No barriers of communication were apparent.        Orders and Diagnoses  Diagnoses and all orders for this visit:  Respiratory tract infection  -     azithromycin (Zithromax) 250 mg tablet; Take 2 tabs (500 mg) by mouth today, than 1 daily for 4 days.  -     albuterol (Ventolin HFA) 90 mcg/actuation inhaler; Inhale 2 puffs every 4 hours if needed for wheezing or shortness of breath.  -     benzonatate (Tessalon Perles) 100 mg capsule; Take 1 capsule (100 mg) by mouth 3 times a day as needed for cough for up to 7 days. Do not crush or chew.  -     predniSONE (Deltasone) 20 mg tablet; Take 2 tablets (40 mg) by mouth once daily for 5 days.  Acute cough  -     azithromycin (Zithromax) 250 mg tablet; Take 2 tabs (500 mg) by mouth today, than 1 daily for 4 days.  -     albuterol (Ventolin HFA) 90 mcg/actuation inhaler; Inhale 2 puffs every 4 hours if needed for wheezing or shortness of breath.  -     benzonatate (Tessalon Perles) 100 mg capsule; Take 1 capsule (100 mg) by mouth 3 times a day as needed for cough for up to 7 days. Do not crush or chew.  -     predniSONE (Deltasone) 20 mg tablet; Take 2 tablets (40 mg) by mouth once daily for 5 days.      Medical Admin Record      Patient disposition: Home    Electronically signed by Zackery Pickering PA-C  10:22 AM

## 2025-01-13 ENCOUNTER — LAB (OUTPATIENT)
Dept: LAB | Facility: LAB | Age: 55
End: 2025-01-13
Payer: COMMERCIAL

## 2025-01-13 ENCOUNTER — APPOINTMENT (OUTPATIENT)
Dept: PRIMARY CARE | Facility: CLINIC | Age: 55
End: 2025-01-13
Payer: COMMERCIAL

## 2025-01-13 VITALS
DIASTOLIC BLOOD PRESSURE: 60 MMHG | HEART RATE: 75 BPM | SYSTOLIC BLOOD PRESSURE: 110 MMHG | WEIGHT: 262 LBS | RESPIRATION RATE: 16 BRPM | OXYGEN SATURATION: 96 % | TEMPERATURE: 98 F | BODY MASS INDEX: 36.68 KG/M2 | HEIGHT: 71 IN

## 2025-01-13 DIAGNOSIS — E66.01 CLASS 2 SEVERE OBESITY WITH BODY MASS INDEX (BMI) OF 35 TO 39.9 WITH SERIOUS COMORBIDITY: ICD-10-CM

## 2025-01-13 DIAGNOSIS — F41.9 ANXIETY: ICD-10-CM

## 2025-01-13 DIAGNOSIS — E66.812 CLASS 2 SEVERE OBESITY WITH BODY MASS INDEX (BMI) OF 35 TO 39.9 WITH SERIOUS COMORBIDITY: ICD-10-CM

## 2025-01-13 DIAGNOSIS — R09.89 THROAT CLEARING: ICD-10-CM

## 2025-01-13 DIAGNOSIS — Z00.00 HEALTHCARE MAINTENANCE: Primary | ICD-10-CM

## 2025-01-13 DIAGNOSIS — Z00.00 HEALTHCARE MAINTENANCE: ICD-10-CM

## 2025-01-13 DIAGNOSIS — L30.9 ECZEMA, UNSPECIFIED TYPE: ICD-10-CM

## 2025-01-13 DIAGNOSIS — Z12.5 SCREENING FOR PROSTATE CANCER: ICD-10-CM

## 2025-01-13 DIAGNOSIS — K76.0 FATTY INFILTRATION OF LIVER: ICD-10-CM

## 2025-01-13 DIAGNOSIS — K21.9 GASTROESOPHAGEAL REFLUX DISEASE WITHOUT ESOPHAGITIS: ICD-10-CM

## 2025-01-13 DIAGNOSIS — G47.33 OSA ON CPAP: ICD-10-CM

## 2025-01-13 LAB
ALBUMIN SERPL BCP-MCNC: 4.4 G/DL (ref 3.4–5)
ALP SERPL-CCNC: 41 U/L (ref 33–120)
ALT SERPL W P-5'-P-CCNC: 72 U/L (ref 10–52)
ANION GAP SERPL CALC-SCNC: 15 MMOL/L (ref 10–20)
APPEARANCE UR: CLEAR
AST SERPL W P-5'-P-CCNC: 52 U/L (ref 9–39)
BILIRUB SERPL-MCNC: 0.5 MG/DL (ref 0–1.2)
BILIRUB UR STRIP.AUTO-MCNC: NEGATIVE MG/DL
BUN SERPL-MCNC: 14 MG/DL (ref 6–23)
CALCIUM SERPL-MCNC: 9.3 MG/DL (ref 8.6–10.6)
CHLORIDE SERPL-SCNC: 102 MMOL/L (ref 98–107)
CHOLEST SERPL-MCNC: 178 MG/DL (ref 0–199)
CHOLESTEROL/HDL RATIO: 4.1
CO2 SERPL-SCNC: 28 MMOL/L (ref 21–32)
COLOR UR: NORMAL
CREAT SERPL-MCNC: 0.83 MG/DL (ref 0.5–1.3)
EGFRCR SERPLBLD CKD-EPI 2021: >90 ML/MIN/1.73M*2
ERYTHROCYTE [DISTWIDTH] IN BLOOD BY AUTOMATED COUNT: 13.3 % (ref 11.5–14.5)
GLUCOSE SERPL-MCNC: 98 MG/DL (ref 74–99)
GLUCOSE UR STRIP.AUTO-MCNC: NORMAL MG/DL
HCT VFR BLD AUTO: 43.4 % (ref 41–52)
HDLC SERPL-MCNC: 43 MG/DL
HGB BLD-MCNC: 14.3 G/DL (ref 13.5–17.5)
KETONES UR STRIP.AUTO-MCNC: NEGATIVE MG/DL
LDLC SERPL CALC-MCNC: 106 MG/DL
LEUKOCYTE ESTERASE UR QL STRIP.AUTO: NEGATIVE
MCH RBC QN AUTO: 29.1 PG (ref 26–34)
MCHC RBC AUTO-ENTMCNC: 32.9 G/DL (ref 32–36)
MCV RBC AUTO: 88 FL (ref 80–100)
NITRITE UR QL STRIP.AUTO: NEGATIVE
NON HDL CHOLESTEROL: 135 MG/DL (ref 0–149)
NRBC BLD-RTO: 0 /100 WBCS (ref 0–0)
PH UR STRIP.AUTO: 7 [PH]
PLATELET # BLD AUTO: 210 X10*3/UL (ref 150–450)
POTASSIUM SERPL-SCNC: 4.5 MMOL/L (ref 3.5–5.3)
PROT SERPL-MCNC: 6.9 G/DL (ref 6.4–8.2)
PROT UR STRIP.AUTO-MCNC: NEGATIVE MG/DL
PSA SERPL-MCNC: 0.25 NG/ML
RBC # BLD AUTO: 4.92 X10*6/UL (ref 4.5–5.9)
RBC # UR STRIP.AUTO: NEGATIVE /UL
SODIUM SERPL-SCNC: 140 MMOL/L (ref 136–145)
SP GR UR STRIP.AUTO: 1.02
TRIGL SERPL-MCNC: 147 MG/DL (ref 0–149)
TSH SERPL-ACNC: 0.8 MIU/L (ref 0.44–3.98)
UROBILINOGEN UR STRIP.AUTO-MCNC: NORMAL MG/DL
VLDL: 29 MG/DL (ref 0–40)
WBC # BLD AUTO: 4.9 X10*3/UL (ref 4.4–11.3)

## 2025-01-13 PROCEDURE — 84443 ASSAY THYROID STIM HORMONE: CPT

## 2025-01-13 PROCEDURE — 3008F BODY MASS INDEX DOCD: CPT | Performed by: INTERNAL MEDICINE

## 2025-01-13 PROCEDURE — 80061 LIPID PANEL: CPT

## 2025-01-13 PROCEDURE — 1036F TOBACCO NON-USER: CPT | Performed by: INTERNAL MEDICINE

## 2025-01-13 PROCEDURE — 84153 ASSAY OF PSA TOTAL: CPT

## 2025-01-13 PROCEDURE — 80053 COMPREHEN METABOLIC PANEL: CPT

## 2025-01-13 PROCEDURE — 99396 PREV VISIT EST AGE 40-64: CPT | Performed by: INTERNAL MEDICINE

## 2025-01-13 PROCEDURE — 85027 COMPLETE CBC AUTOMATED: CPT

## 2025-01-13 PROCEDURE — 81003 URINALYSIS AUTO W/O SCOPE: CPT

## 2025-01-13 RX ORDER — FLUOCINOLONE ACETONIDE 0.1 MG/ML
SOLUTION TOPICAL 2 TIMES DAILY
Qty: 60 ML | Refills: 1 | Status: SHIPPED | OUTPATIENT
Start: 2025-01-13 | End: 2026-01-13

## 2025-01-13 ASSESSMENT — ENCOUNTER SYMPTOMS
WEAKNESS: 0
NECK PAIN: 0
TROUBLE SWALLOWING: 0
POLYDIPSIA: 0
SORE THROAT: 0
NERVOUS/ANXIOUS: 0
SHORTNESS OF BREATH: 0
VOMITING: 0
DIFFICULTY URINATING: 0
LIGHT-HEADEDNESS: 0
NECK STIFFNESS: 0
SINUS PRESSURE: 0
COLOR CHANGE: 0
CONSTIPATION: 0
BRUISES/BLEEDS EASILY: 0
APPETITE CHANGE: 0
NAUSEA: 0
WHEEZING: 0
FLANK PAIN: 0
VOICE CHANGE: 0
EYE PAIN: 0
BACK PAIN: 0
MUSCULOSKELETAL NEGATIVE: 1
DYSURIA: 0
ABDOMINAL PAIN: 0
STRIDOR: 0
SPEECH DIFFICULTY: 0
POLYPHAGIA: 0
ARTHRALGIAS: 0
ADENOPATHY: 0
EYE DISCHARGE: 0
ENDOCRINE NEGATIVE: 1
CARDIOVASCULAR NEGATIVE: 1
PALPITATIONS: 0
MYALGIAS: 0
COUGH: 0
HEMATOLOGIC/LYMPHATIC NEGATIVE: 1
AGITATION: 0
ALLERGIC/IMMUNOLOGIC NEGATIVE: 1
CONFUSION: 0
JOINT SWELLING: 0
FACIAL ASYMMETRY: 0
NUMBNESS: 0
BLOOD IN STOOL: 0
SINUS PAIN: 0
RESPIRATORY NEGATIVE: 1
DIARRHEA: 0
WOUND: 0
FREQUENCY: 0
SLEEP DISTURBANCE: 0
CONSTITUTIONAL NEGATIVE: 1
DIZZINESS: 0
PSYCHIATRIC NEGATIVE: 1
EYES NEGATIVE: 1
TREMORS: 0
CHEST TIGHTNESS: 0
UNEXPECTED WEIGHT CHANGE: 0
NEUROLOGICAL NEGATIVE: 1
EYE REDNESS: 0
HALLUCINATIONS: 0
SEIZURES: 0
HEADACHES: 0
ACTIVITY CHANGE: 0
GASTROINTESTINAL NEGATIVE: 1

## 2025-01-13 ASSESSMENT — PATIENT HEALTH QUESTIONNAIRE - PHQ9
SUM OF ALL RESPONSES TO PHQ9 QUESTIONS 1 AND 2: 0
2. FEELING DOWN, DEPRESSED OR HOPELESS: NOT AT ALL
1. LITTLE INTEREST OR PLEASURE IN DOING THINGS: NOT AT ALL

## 2025-01-13 NOTE — PROGRESS NOTES
Subjective   Patient ID: Barrera Ruiz is a 54 y.o. male who presents for Annual Exam (Pt is here due to annual physical).    HPI     Patient has been feeling pretty good and has been complaint with prescribed medications.    We reviewed and discussed details of recent blood work: CBC, CMP, TSH, Lipid panel, PSA done in 2024.  Results within acceptable range except  mildly elevated LFT's  Colonoscopy: 2020, next 2030    Underwent inguinal hernia surgery in 2024, tolerated well.    Saw Dr. Vizcarra in 2024 regarding fatty liver and elevated LFT's.  Advised weight loss, mediterranean diet.      C/o need to clear his throat all the time.    C/o dryness in right ear canal, request refill on steroidal solution, previously prescribed by dermatology.      He has been using CPAP machine every night for about 15 years, received new machine in 2024.   Feels better when using it.  Follows with sleep medicine Dr. Barnard.    Review of Systems   Constitutional: Negative.  Negative for activity change, appetite change and unexpected weight change.   HENT: Negative.  Negative for congestion, ear discharge, ear pain, hearing loss, mouth sores, nosebleeds, sinus pressure, sinus pain, sore throat, trouble swallowing and voice change.    Eyes: Negative.  Negative for pain, discharge, redness and visual disturbance.   Respiratory: Negative.  Negative for cough, chest tightness, shortness of breath, wheezing and stridor.    Cardiovascular: Negative.  Negative for chest pain, palpitations and leg swelling.   Gastrointestinal: Negative.  Negative for abdominal pain, blood in stool, constipation, diarrhea, nausea and vomiting.   Endocrine: Negative.  Negative for polydipsia, polyphagia and polyuria.   Genitourinary: Negative.  Negative for difficulty urinating, dysuria, flank pain, frequency and urgency.   Musculoskeletal: Negative.  Negative for arthralgias, back pain, gait problem, joint swelling, myalgias, neck pain and neck stiffness.  "  Skin: Negative.  Negative for color change, rash and wound.   Allergic/Immunologic: Negative.  Negative for environmental allergies, food allergies and immunocompromised state.   Neurological: Negative.  Negative for dizziness, tremors, seizures, syncope, facial asymmetry, speech difficulty, weakness, light-headedness, numbness and headaches.   Hematological: Negative.  Negative for adenopathy. Does not bruise/bleed easily.   Psychiatric/Behavioral: Negative.  Negative for agitation, behavioral problems, confusion, hallucinations, sleep disturbance and suicidal ideas. The patient is not nervous/anxious.    All other systems reviewed and are negative.      Objective   /60 (BP Location: Left arm, Patient Position: Sitting, BP Cuff Size: Large adult)   Pulse 75   Temp 36.7 °C (98 °F) (Temporal)   Resp 16   Ht 1.803 m (5' 11\")   Wt 119 kg (262 lb)   SpO2 96%   BMI 36.54 kg/m²     Physical Exam  Vitals and nursing note reviewed.   Constitutional:       General: He is not in acute distress.     Appearance: Normal appearance.   HENT:      Head: Normocephalic and atraumatic.      Right Ear: Tympanic membrane, ear canal and external ear normal.      Left Ear: Tympanic membrane, ear canal and external ear normal.      Nose: Nose normal. No congestion or rhinorrhea.      Mouth/Throat:      Mouth: Mucous membranes are moist.      Pharynx: Oropharynx is clear.   Eyes:      General:         Right eye: No discharge.         Left eye: No discharge.      Extraocular Movements: Extraocular movements intact.      Conjunctiva/sclera: Conjunctivae normal.      Pupils: Pupils are equal, round, and reactive to light.   Cardiovascular:      Rate and Rhythm: Normal rate and regular rhythm.      Pulses: Normal pulses.      Heart sounds: Normal heart sounds. No murmur heard.     No friction rub. No gallop.   Pulmonary:      Effort: Pulmonary effort is normal. No respiratory distress.      Breath sounds: Normal breath sounds. No " stridor. No wheezing, rhonchi or rales.   Chest:      Chest wall: No tenderness.   Abdominal:      General: Bowel sounds are normal.      Palpations: Abdomen is soft. There is no mass.      Tenderness: There is no abdominal tenderness. There is no guarding or rebound.   Musculoskeletal:         General: No swelling or deformity. Normal range of motion.      Cervical back: Normal range of motion and neck supple. No rigidity or tenderness.      Right lower leg: No edema.      Left lower leg: No edema.   Lymphadenopathy:      Cervical: No cervical adenopathy.   Skin:     General: Skin is warm and dry.      Coloration: Skin is not jaundiced.      Findings: No bruising or erythema.   Neurological:      General: No focal deficit present.      Mental Status: He is alert and oriented to person, place, and time. Mental status is at baseline.      Cranial Nerves: No cranial nerve deficit.      Motor: No weakness.      Coordination: Coordination normal.      Gait: Gait normal.   Psychiatric:         Mood and Affect: Mood normal.         Behavior: Behavior normal.         Thought Content: Thought content normal.         Judgment: Judgment normal.         Assessment/Plan   Problem List Items Addressed This Visit             ICD-10-CM       ENT    Throat clearing R09.89    Relevant Orders    Referral to ENT       Endocrine/Metabolic    Class 2 severe obesity with body mass index (BMI) of 35 to 39.9 with serious comorbidity E66.812, E66.01     Weight loss is advised. Target BMI: 25. Please follow low carbohydrate diet and exercise at least 150 minutes weekly.  Nutritional consultation is available, please let me know if interested.             Gastrointestinal and Abdominal    Fatty infiltration of liver K76.0     Clinucally stab;le. F/up with hepatology.         GERD (gastroesophageal reflux disease) K21.9     Clinically stable. Continue Omeprazole.            Health Encounters    Healthcare maintenance - Primary Z00.00     Relevant Orders    CBC    Comprehensive Metabolic Panel    Lipid Panel    TSH with reflex to Free T4 if abnormal    Urinalysis with Reflex Microscopic       Mental Health    Anxiety F41.9     Clinically stable. Continue current treatment.            Skin    Eczema L30.9    Relevant Medications    fluocinolone (Synalar) 0.01 % external solution       Sleep    BARBI on CPAP G47.33     Clinically stable. Continue CPAP. F/up with sleep medicine.          Other Visit Diagnoses         Codes    Screening for prostate cancer     Z12.5    Relevant Orders    Prostate Specific Antigen, Screen        It was a pleasure to see you today.  I would like to remind you about importance of a healthy lifestyle in order to improve your well-being and live longer.  Try to engage in physical activities for at least 150 minutes per week.  Eat about 10 servings of fruits and vegetables daily. My advice is 2 servings of fruits and 8 servings of vegetables.  For vegetables choose at least half of them green and at least half of them fresh.  Please avoid sugar, salt, fried food and saturated fat.    F/up in 1 year or sooner if needed.

## 2025-01-19 NOTE — RESULT ENCOUNTER NOTE
Your recent lab work was acceptable except mildly elevated liver enzymes.  Weight los is advised.   All results may not be within normal range but they are not clinically significant at this time and do not require change in your therapy. We will discuss details during your next office visit. Please keep your next appointment as scheduled. Dr. Marquez

## 2025-03-22 ENCOUNTER — PATIENT MESSAGE (OUTPATIENT)
Dept: PRIMARY CARE | Facility: CLINIC | Age: 55
End: 2025-03-22
Payer: COMMERCIAL

## 2025-03-22 DIAGNOSIS — K21.9 GASTROESOPHAGEAL REFLUX DISEASE WITHOUT ESOPHAGITIS: ICD-10-CM

## 2025-03-24 RX ORDER — OMEPRAZOLE 20 MG/1
20 CAPSULE, DELAYED RELEASE ORAL
Qty: 90 CAPSULE | Refills: 2 | Status: SHIPPED | OUTPATIENT
Start: 2025-03-24

## 2025-06-05 ENCOUNTER — APPOINTMENT (OUTPATIENT)
Dept: PRIMARY CARE | Facility: CLINIC | Age: 55
End: 2025-06-05
Payer: COMMERCIAL

## 2025-06-05 VITALS
TEMPERATURE: 98 F | OXYGEN SATURATION: 98 % | SYSTOLIC BLOOD PRESSURE: 120 MMHG | BODY MASS INDEX: 36.85 KG/M2 | HEART RATE: 98 BPM | RESPIRATION RATE: 16 BRPM | HEIGHT: 71 IN | DIASTOLIC BLOOD PRESSURE: 80 MMHG | WEIGHT: 263.2 LBS

## 2025-06-05 DIAGNOSIS — E66.01 CLASS 2 SEVERE OBESITY DUE TO EXCESS CALORIES WITH SERIOUS COMORBIDITY AND BODY MASS INDEX (BMI) OF 36.0 TO 36.9 IN ADULT: Primary | ICD-10-CM

## 2025-06-05 DIAGNOSIS — K76.0 FATTY INFILTRATION OF LIVER: ICD-10-CM

## 2025-06-05 DIAGNOSIS — K21.9 GASTROESOPHAGEAL REFLUX DISEASE WITHOUT ESOPHAGITIS: ICD-10-CM

## 2025-06-05 DIAGNOSIS — E66.812 CLASS 2 SEVERE OBESITY DUE TO EXCESS CALORIES WITH SERIOUS COMORBIDITY AND BODY MASS INDEX (BMI) OF 36.0 TO 36.9 IN ADULT: Primary | ICD-10-CM

## 2025-06-05 DIAGNOSIS — F41.9 ANXIETY: ICD-10-CM

## 2025-06-05 DIAGNOSIS — E78.5 BORDERLINE HYPERLIPIDEMIA: ICD-10-CM

## 2025-06-05 DIAGNOSIS — G47.33 OSA ON CPAP: ICD-10-CM

## 2025-06-05 PROCEDURE — 1036F TOBACCO NON-USER: CPT | Performed by: INTERNAL MEDICINE

## 2025-06-05 PROCEDURE — 99214 OFFICE O/P EST MOD 30 MIN: CPT | Performed by: INTERNAL MEDICINE

## 2025-06-05 PROCEDURE — 3008F BODY MASS INDEX DOCD: CPT | Performed by: INTERNAL MEDICINE

## 2025-06-05 ASSESSMENT — ENCOUNTER SYMPTOMS
PSYCHIATRIC NEGATIVE: 1
ARTHRALGIAS: 0
WEAKNESS: 0
VOICE CHANGE: 0
ENDOCRINE NEGATIVE: 1
POLYPHAGIA: 0
FLANK PAIN: 0
CHEST TIGHTNESS: 0
STRIDOR: 0
CARDIOVASCULAR NEGATIVE: 1
CONSTIPATION: 0
CONSTITUTIONAL NEGATIVE: 1
VOMITING: 0
RESPIRATORY NEGATIVE: 1
BLOOD IN STOOL: 0
EYE REDNESS: 0
UNEXPECTED WEIGHT CHANGE: 0
LIGHT-HEADEDNESS: 0
DIFFICULTY URINATING: 0
BACK PAIN: 0
ALLERGIC/IMMUNOLOGIC NEGATIVE: 1
HALLUCINATIONS: 0
ACTIVITY CHANGE: 0
SINUS PAIN: 0
DIZZINESS: 0
COUGH: 0
EYES NEGATIVE: 1
WOUND: 0
TREMORS: 0
AGITATION: 0
WHEEZING: 0
HEMATOLOGIC/LYMPHATIC NEGATIVE: 1
ADENOPATHY: 0
ABDOMINAL PAIN: 0
NERVOUS/ANXIOUS: 0
BRUISES/BLEEDS EASILY: 0
POLYDIPSIA: 0
NECK PAIN: 0
MUSCULOSKELETAL NEGATIVE: 1
DYSURIA: 0
HEADACHES: 0
EYE DISCHARGE: 0
MYALGIAS: 0
NEUROLOGICAL NEGATIVE: 1
APPETITE CHANGE: 0
TROUBLE SWALLOWING: 0
NUMBNESS: 0
FREQUENCY: 0
SPEECH DIFFICULTY: 0
PALPITATIONS: 0
NECK STIFFNESS: 0
CONFUSION: 0
NAUSEA: 0
SLEEP DISTURBANCE: 0
COLOR CHANGE: 0
FACIAL ASYMMETRY: 0
EYE PAIN: 0
JOINT SWELLING: 0
SORE THROAT: 0
SEIZURES: 0
SINUS PRESSURE: 0
GASTROINTESTINAL NEGATIVE: 1
SHORTNESS OF BREATH: 0
DIARRHEA: 0

## 2025-06-05 ASSESSMENT — PATIENT HEALTH QUESTIONNAIRE - PHQ9
1. LITTLE INTEREST OR PLEASURE IN DOING THINGS: NOT AT ALL
2. FEELING DOWN, DEPRESSED OR HOPELESS: NOT AT ALL
SUM OF ALL RESPONSES TO PHQ9 QUESTIONS 1 AND 2: 0

## 2025-06-05 NOTE — PROGRESS NOTES
Subjective   Patient ID: Barrera Ruiz is a 55 y.o. male who presents for Follow-up (Pt is here due to a follow up.).    HPI     Patient has been feeling pretty good and has been complaint with prescribed medications.    We reviewed and discussed details of recent blood work: CBC, CMP, TSH, Lipid panel, Hb A1c, Vit D, PSA done in 2025.  Results within acceptable range except  mildly elevated liver enzymes.  Patient was diagnosed with fatty liver infiltration.    Obesity counseling:  Patient has been having difficulties losing weight.  Patient's BMI is above 30.   Patient has tried low calorie, low carbohydrate diet and exercise program for more than 6 months without success.  We discussed weight loss subject at length, low calorie and low carbohydrate diet advised. Details explained.   Patient agreed to try above along with performing physical activities/exercise for at least 150 minutes per week.   Patient was offered consultation with dietician to assist with weight loss goals.  We discussed FDA approved pharmacological treatments to be used as treatment intensification along with lifestyle modifications for weight loss  We discussed consulting [facility] pharmacist to assist with medication administration, advancing the dose and monitoring treatment. Patient agreed.  Will follow up in 3 months to check on the progress of weight loss.  Time for counselin minutes.    We discussed starting weekly GIP/GLP1a injections. We discussed contraindications including medullary thyroid ca and Multiple Neoplasia Syndrome type 2.  Also possible side effects discussed: nausea, vomiting,  anaphylaxis, angioedema, pancreatitis, gallbladder disease.  Patient agreeable to try.    Patient was diagnosed with BARBI and has been using CPAP for 15 years.  Received new  CPAP machine in .   Feels better when using it.  Follows with sleep medicine Dr. Barnard.     He was also diagnosed with fatty liver.  Saw Dr. Vizcarra in   regarding fatty liver and elevated LFT's. Advised weight loss, mediterranean diet.     He will benefit from  weight loss to better controlled both conditions.        Review of Systems   Constitutional: Negative.  Negative for activity change, appetite change and unexpected weight change.   HENT: Negative.  Negative for congestion, ear discharge, ear pain, hearing loss, mouth sores, nosebleeds, sinus pressure, sinus pain, sore throat, trouble swallowing and voice change.    Eyes: Negative.  Negative for pain, discharge, redness and visual disturbance.   Respiratory: Negative.  Negative for cough, chest tightness, shortness of breath, wheezing and stridor.    Cardiovascular: Negative.  Negative for chest pain, palpitations and leg swelling.   Gastrointestinal: Negative.  Negative for abdominal pain, blood in stool, constipation, diarrhea, nausea and vomiting.   Endocrine: Negative.  Negative for polydipsia, polyphagia and polyuria.   Genitourinary: Negative.  Negative for difficulty urinating, dysuria, flank pain, frequency and urgency.   Musculoskeletal: Negative.  Negative for arthralgias, back pain, gait problem, joint swelling, myalgias, neck pain and neck stiffness.   Skin: Negative.  Negative for color change, rash and wound.   Allergic/Immunologic: Negative.  Negative for environmental allergies, food allergies and immunocompromised state.   Neurological: Negative.  Negative for dizziness, tremors, seizures, syncope, facial asymmetry, speech difficulty, weakness, light-headedness, numbness and headaches.   Hematological: Negative.  Negative for adenopathy. Does not bruise/bleed easily.   Psychiatric/Behavioral: Negative.  Negative for agitation, behavioral problems, confusion, hallucinations, sleep disturbance and suicidal ideas. The patient is not nervous/anxious.    All other systems reviewed and are negative.      Objective   /80 (BP Location: Right arm, Patient Position: Sitting, BP Cuff Size: Adult)   " Pulse 98   Temp 36.7 °C (98 °F) (Temporal)   Resp 16   Ht 1.803 m (5' 11\")   Wt 119 kg (263 lb 3.2 oz)   SpO2 98%   BMI 36.71 kg/m²     Physical Exam  Vitals and nursing note reviewed.   Constitutional:       General: He is not in acute distress.     Appearance: Normal appearance.   HENT:      Head: Normocephalic and atraumatic.      Right Ear: External ear normal.      Left Ear: External ear normal.      Nose: Nose normal. No congestion or rhinorrhea.   Eyes:      General:         Right eye: No discharge.         Left eye: No discharge.      Extraocular Movements: Extraocular movements intact.      Conjunctiva/sclera: Conjunctivae normal.      Pupils: Pupils are equal, round, and reactive to light.   Cardiovascular:      Rate and Rhythm: Normal rate and regular rhythm.      Pulses: Normal pulses.      Heart sounds: Normal heart sounds. No murmur heard.     No friction rub. No gallop.   Pulmonary:      Effort: Pulmonary effort is normal. No respiratory distress.      Breath sounds: Normal breath sounds. No stridor. No wheezing, rhonchi or rales.   Chest:      Chest wall: No tenderness.   Abdominal:      General: Bowel sounds are normal.      Palpations: Abdomen is soft. There is no mass.      Tenderness: There is no abdominal tenderness. There is no guarding or rebound.   Musculoskeletal:         General: No swelling or deformity. Normal range of motion.      Cervical back: Normal range of motion and neck supple. No rigidity or tenderness.      Right lower leg: No edema.      Left lower leg: No edema.   Lymphadenopathy:      Cervical: No cervical adenopathy.   Skin:     General: Skin is warm and dry.      Coloration: Skin is not jaundiced.      Findings: No bruising or erythema.   Neurological:      General: No focal deficit present.      Mental Status: He is alert and oriented to person, place, and time. Mental status is at baseline.      Cranial Nerves: No cranial nerve deficit.      Motor: No weakness.    "   Coordination: Coordination normal.      Gait: Gait normal.   Psychiatric:         Mood and Affect: Mood normal.         Behavior: Behavior normal.         Thought Content: Thought content normal.         Judgment: Judgment normal.         Assessment/Plan   Problem List Items Addressed This Visit           ICD-10-CM       Cardiac and Vasculature    Borderline hyperlipidemia E78.5    Low cholesterol and low carbohydrate diet is advised.             Gastrointestinal and Abdominal    Fatty infiltration of liver K76.0    Clinically stable. F/up with hepatology.         Relevant Orders    Referral to Clinical Pharmacy    GERD (gastroesophageal reflux disease) K21.9    Clinically stable. Continue Omeprazole.            Mental Health    Anxiety F41.9    Clinically stable. Continue current treatment.            Sleep    BARBI on CPAP G47.33    Clinically stable. Continue CPAP. F/up with sleep medicine.         Relevant Orders    Referral to Clinical Pharmacy     Other Visit Diagnoses         Codes      Class 2 severe obesity due to excess calories with serious comorbidity and body mass index (BMI) of 36.0 to 36.9 in adult    -  Primary E66.812, E66.01, Z68.36    Relevant Orders    Referral to Clinical Pharmacy        It was a pleasure to see you today.  I would like to remind you about importance of a healthy lifestyle in order to improve your well-being and live longer.  Try to engage in physical activities for at least 150 minutes per week.  Eat about 10 servings of fruits and vegetables daily. My advice is 2 servings of fruits and 8 servings of vegetables.  For vegetables choose at least half of them green and at least half of them fresh.  Please avoid sugar, salt, fried food and saturated fat.    I spent a total of 30 minutes on the date of service for follow up visit, which included preparing to see the patient, face-to-face patient care, completing clinical documentation, obtaining and/or reviewing separately obtained  history, performing a medically appropriate examination, counseling and educating the patient/family/caregiver, ordering medications, tests, or procedures, communicating with other health care providers (not separately reported), independently interpreting results (not separately reported), communicating results to the patient/family/caregiver, and care coordination (not separately reported).    F/up in 4 months or sooner if needed.

## 2025-06-26 ENCOUNTER — APPOINTMENT (OUTPATIENT)
Dept: PHARMACY | Facility: HOSPITAL | Age: 55
End: 2025-06-26
Payer: COMMERCIAL

## 2025-06-26 DIAGNOSIS — E66.01 CLASS 2 SEVERE OBESITY DUE TO EXCESS CALORIES WITH SERIOUS COMORBIDITY AND BODY MASS INDEX (BMI) OF 36.0 TO 36.9 IN ADULT: ICD-10-CM

## 2025-06-26 DIAGNOSIS — K76.0 FATTY INFILTRATION OF LIVER: ICD-10-CM

## 2025-06-26 DIAGNOSIS — E66.812 CLASS 2 SEVERE OBESITY DUE TO EXCESS CALORIES WITH SERIOUS COMORBIDITY AND BODY MASS INDEX (BMI) OF 36.0 TO 36.9 IN ADULT: ICD-10-CM

## 2025-06-26 DIAGNOSIS — G47.33 OSA ON CPAP: ICD-10-CM

## 2025-06-26 PROCEDURE — RXMED WILLOW AMBULATORY MEDICATION CHARGE

## 2025-06-26 RX ORDER — TIRZEPATIDE 2.5 MG/.5ML
2.5 INJECTION, SOLUTION SUBCUTANEOUS
Qty: 2 ML | Refills: 2 | Status: SHIPPED | OUTPATIENT
Start: 2025-06-26

## 2025-06-26 RX ORDER — TIRZEPATIDE 2.5 MG/.5ML
2.5 INJECTION, SOLUTION SUBCUTANEOUS
Qty: 2 ML | Refills: 2 | Status: SHIPPED | OUTPATIENT
Start: 2025-06-26 | End: 2025-06-26 | Stop reason: SDUPTHER

## 2025-06-26 NOTE — PROGRESS NOTES
Clinical Pharmacy Appointment    Patient ID: Barrera Ruiz is a 55 y.o. male who presents for Obesity.    Pt is here for First appointment.     Referring Provider: Roberta Goldman M*  PCP: Roberta Goldman MD PhD  Last visit with PCP: 6/5/2025   Next visit with PCP: 10/6/2025    Subjective     WEIGHT LOSS  BMI Readings from Last 3 Encounters:   06/05/25 36.71 kg/m²   01/13/25 36.54 kg/m²   03/12/24 38.63 kg/m²      Starting weight: 263 lbs. (6/5/2025)  Current TH weight: 260 lbs (Jose weight); Varies between 260-270 lbs    Weight Goals  Next goal: Weight -5% (13 lbs.)  Maintenance BMI Goal: 23 to 29.9 reasonable due to age  Patient defined goal(s):   Goal is to lose 20-30 lbs to become more regular in gym participation     Lifestyle  Diet: 2-3 meals/day.   BK: Protein bar  LN: Skips  DN: Protein + carbohydrate   Minimal vegetable consumption   Snacks: Bowl of cereal (rice krispy), aristeo crackers, pretzels  Cookies around the time of holidays  Drinks: Diet coke, crystal light packets mixed with 32 oz of water  Estimates 32-64 fl oz   He does not feel that he eats a lot of food during the day  He believes the worst food that he consumes is pizza 1-2 times weekly  His wife is generally cooking at home most days of the week (4-5 days)  Has two children at home   Has worked with nutritionist/dietician? No  Has worked with weight management? No  Exercise:   No formal regimen currently  Is limited by: not enough time    Other Potential Contributing Factors  Alcohol use: Average 0 drinks/month  Tobacco use: No  Other drug use: No  Medications that may cause weight gain: No  Mental health: No current concerns  Eating Disorders: Denies Hx of any eating disorder  Comorbidities: Hyperlipidemia , Sleep Apnea, and fatty liver infiltrate    Pertinent PMH Review:  PMH of Pancreatitis: No  PMH of Retinopathy: No  PMH of MTC: No  PMH of MEN2: No    Non-Pharmacological Therapy  Weight loss techniques attempted:  Weight  watchers - 10 years    Pharmacological Therapy  Current Medications: None  Clarifications to above regimen: N/A  Adverse Effects: N/A  Previous Medications: None     Insurance coverage of weight-loss medications? Yes  Eligible for copay cards/programs? Yes    Medication Estimated Cost  Without Insurance Expected weight loss Patient Risks/Cautions Notes   Wegovy (semaglutide) $499/month   w/ savings card 10-20% No known concerns      Zepbound (tirzepatide) $499/month vials via [employer].  $650/month pens w/ savings card. 15-25%     Saxenda (liraglutide) ~$1300/month 5-15%     Compounded semaglutide + additives $199-300/month Limited data  Not FDA approved      Medication Reconciliation:  Changed: None  Added:   Turmeric oral  Liver Aid oral  Discontinued: None    Drug Interactions  No relevant drug interactions were noted.    Medication System Management  Affordability/Accessibility:   Prior authorization approved through 6/30/2025?       Objective   Allergies[1]  Social History     Social History Narrative    Not on file      Medication Review  Current Outpatient Medications   Medication Instructions    citalopram (CELEXA) 20 mg, oral, Daily    fluocinolone (Synalar) 0.01 % external solution Topical, 2 times daily    milk thistle/NAC/dandel/turmer (LIVER COMPLEX ORAL) Take by mouth.    omeprazole (PRILOSEC) 20 mg, oral, Daily before breakfast    TURMERIC ORAL Take by mouth.    Zepbound 2.5 mg, subcutaneous, Every 7 days      Vitals  BP Readings from Last 2 Encounters:   06/05/25 120/80   01/13/25 110/60     BMI Readings from Last 1 Encounters:   06/05/25 36.71 kg/m²      Labs  A1C  Lab Results   Component Value Date    HGBA1C 5.5 01/10/2023    HGBA1C 5.6 06/04/2022     BMP  Lab Results   Component Value Date    CALCIUM 9.3 01/13/2025     01/13/2025    K 4.5 01/13/2025    CO2 28 01/13/2025     01/13/2025    BUN 14 01/13/2025    CREATININE 0.83 01/13/2025    EGFR >90 01/13/2025     LFTs  Lab Results  "  Component Value Date    ALT 72 (H) 01/13/2025    AST 52 (H) 01/13/2025    ALKPHOS 41 01/13/2025    BILITOT 0.5 01/13/2025     FLP  Lab Results   Component Value Date    TRIG 147 01/13/2025    CHOL 178 01/13/2025    LDLF 98 01/10/2023    LDLCALC 106 (H) 01/13/2025    HDL 43.0 01/13/2025     Urine Microalbumin  No results found for: \"MICROALBCREA\"    Weight Management  Wt Readings from Last 3 Encounters:   06/05/25 119 kg (263 lb 3.2 oz)   01/13/25 119 kg (262 lb)   03/12/24 126 kg (277 lb)      There is no height or weight on file to calculate BMI.     Assessment/Plan   Problem List Items Addressed This Visit       Fatty infiltration of liver    Relevant Medications    tirzepatide, weight loss, (Zepbound) 2.5 mg/0.5 mL injection    BARBI on CPAP    Current regimen includes Zepbound 2.5 mg once weekly, therapy not started prior to appointment. Patient's current weight reported as 260 lbs, starting weight is 263 lbs at his office visit. Zepbound is covered under a prior authorization through his current prescription benefit through 6/30/2025. As such, I sent an updated prescription to a local pharmacy for him to pickup. The current cost is $24.99 as the savings card is applying 100% towards the cost of therapy. He has a deductible that must be met prior to coverage by his pharmaceutical benefit. He is aware of this. A new authorization will be submitted after 6/30/2025. He does not have any contraindications to the use of GLP1 or GLP1/GIP therapy including a personal or family history of pancreatitis, MTC, or MEN type two. We discussed both the vial and syringe option and autoinjectors, but as his coverage is provided through insurance we went with autoinjectors. We discussed targeting a minimum dose of 10 mg once weekly based on the [facility]BAR[facility] clinic trial results. We discussed a targeted weight loss of 10-20% from baseline. Follow up in 1 month.    Medication Changes:  START  Zepbound 2.5 mg once " weekly    Monitoring and Education:  Counseled patient on relevant medication mechanisms of action, expectations, side effects, duration of therapy, contraindications, administration, and monitoring parameters.  All questions and concerns addressed. Contact pharmacist with any further questions or concerns prior to next appointment.    Zepbound Education:   Counseled patient on Zepbound MOA, expectations, side effects, duration of therapy, administration, and monitoring parameters.  Provided detailed dosing and administration counseling to ensure proper technique.   Reviewed Zepbound titration schedule, starting with 2.5 mg once weekly to a goal of 15 mg once weekly if tolerated  Counseled patient on the benefits of GLP-1ra glycemic control and weight loss  Reviewed storage requirements of Zepbound when not in use, and when to administer the medication if a dose is missed.  Advised patient that they may experience improved satiety after meals and portion sizes of meals may be reduced as doses of Zepound increase.         Relevant Medications    tirzepatide, weight loss, (Zepbound) 2.5 mg/0.5 mL injection     Other Visit Diagnoses         Class 2 severe obesity due to excess calories with serious comorbidity and body mass index (BMI) of 36.0 to 36.9 in adult        Relevant Medications    tirzepatide, weight loss, (Zepbound) 2.5 mg/0.5 mL injection          Clinical Pharmacist follow-up: 7/18/2025 at 1 pm;  Telehealth visit  Patient is not followed in [facility].     Continue all meds under the continuation of care with the referring provider and clinical pharmacy team.    Thank you,  Berlin Burroughs, PharmD    Clinical Pharmacist  [phone]    Verbal consent to manage patient's drug therapy was obtained from the patient. They were informed they may decline to participate or withdraw from participation in pharmacy services at any time.       [1] No Known Allergies

## 2025-06-26 NOTE — PATIENT INSTRUCTIONS
Eating tips to reduce nausea and unwanted GI side effects from GLP1 medications:   Eat slowly  Eat smaller portions  Avoid laying down right after meal  Stop eating when feeling full  Avoid drinking from straw  Stay hydrated, drink small amounts of water throughout the day  Avoid strenuous exercise after eating  Avoid eating close too bedtime     Meal planning tips:  Avoid fried foods, and high fat meals  Focus on bland foods  Choose water rich foods like soups, kefir, protein drinks/smoothies      Lifestyle suggestions:  Keep a food/symptom diary, as it may be helpful to identify meal timing of foods that make nausea worse.  Engage in light exercise (walking), get fresh air     Additional tips for alleviating nausea:  Wait at least 30 minutes after GLP1-RA dose to eat, if feeling nauseated try crackers, apples, mint, yuko root or yuko tea  Avoid strong smells     If vomiting:  Eat smaller amounts of food in more frequent meals  Stay hydrated, but avoid drinks during meals, wait 30-60 minutes before and after meals to have liquids     If having diarrhea:  Generous hydration, i.e: water with lemon and tsp baking soda  Avoid dairy products, laxatives, coffee, alcoholic beverages, soft drinks, very cold or very hot foods, products that contain sugar alcohols (sorbitol, mannitol, xylitol, maltitol), including gum and candy  Avoid (or temporarily reduce your intake of) foods with high fiber content such as grain and seed products, whole grain breads, artichokes, asparagus, beans, cabbage, lentils, mushrooms, onions, garlic, sugar snap peas, skinned fruits, apples, apricots, blackberries, cherries, emily, nectarine, pears, plum  Eat chicken broth, rice, carrots, very ripe fruit without skin  Gradually increase fiber back in when symptoms improve     If having constipation:  Ensure the amount of fiber in diet is adequate (goal is minimum of 25 grams per day).  Increase physical activity  Drink generous amounts of water      Berlin Burroughs, PharmD  [phone]

## 2025-06-27 ENCOUNTER — PHARMACY VISIT (OUTPATIENT)
Dept: PHARMACY | Facility: CLINIC | Age: 55
End: 2025-06-27
Payer: MEDICARE

## 2025-06-27 NOTE — ASSESSMENT & PLAN NOTE
Current regimen includes Zepbound 2.5 mg once weekly, therapy not started prior to appointment. Patient's current weight reported as 260 lbs, starting weight is 263 lbs at his office visit. Zepbound is covered under a prior authorization through his current prescription benefit through 6/30/2025. As such, I sent an updated prescription to a local pharmacy for him to pickup. The current cost is $24.99 as the savings card is applying 100% towards the cost of therapy. He has a deductible that must be met prior to coverage by his pharmaceutical benefit. He is aware of this. A new authorization will be submitted after 6/30/2025. He does not have any contraindications to the use of GLP1 or GLP1/GIP therapy including a personal or family history of pancreatitis, MTC, or MEN type two. We discussed both the vial and syringe option and autoinjectors, but as his coverage is provided through insurance we went with autoinjectors. We discussed targeting a minimum dose of 10 mg once weekly based on the [facility] trial results. We discussed a targeted weight loss of 10-20% from baseline. Follow up in 1 month.    Medication Changes:  START  Zepbound 2.5 mg once weekly    Monitoring and Education:  Counseled patient on relevant medication mechanisms of action, expectations, side effects, duration of therapy, contraindications, administration, and monitoring parameters.  All questions and concerns addressed. Contact pharmacist with any further questions or concerns prior to next appointment.    Zepbound Education:   Counseled patient on Zepbound MOA, expectations, side effects, duration of therapy, administration, and monitoring parameters.  Provided detailed dosing and administration counseling to ensure proper technique.   Reviewed Zepbound titration schedule, starting with 2.5 mg once weekly to a goal of 15 mg once weekly if tolerated  Counseled patient on the benefits of GLP-1ra glycemic control and weight loss  Reviewed  storage requirements of Zepbound when not in use, and when to administer the medication if a dose is missed.  Advised patient that they may experience improved satiety after meals and portion sizes of meals may be reduced as doses of Zepound increase.

## 2025-07-18 ENCOUNTER — TELEMEDICINE (OUTPATIENT)
Dept: PHARMACY | Facility: HOSPITAL | Age: 55
End: 2025-07-18
Payer: COMMERCIAL

## 2025-07-18 DIAGNOSIS — E66.01 CLASS 2 SEVERE OBESITY DUE TO EXCESS CALORIES WITH SERIOUS COMORBIDITY AND BODY MASS INDEX (BMI) OF 36.0 TO 36.9 IN ADULT: Primary | ICD-10-CM

## 2025-07-18 DIAGNOSIS — G47.33 OSA ON CPAP: ICD-10-CM

## 2025-07-18 DIAGNOSIS — E66.812 CLASS 2 SEVERE OBESITY DUE TO EXCESS CALORIES WITH SERIOUS COMORBIDITY AND BODY MASS INDEX (BMI) OF 36.0 TO 36.9 IN ADULT: Primary | ICD-10-CM

## 2025-07-18 NOTE — PROGRESS NOTES
Clinical Pharmacy Appointment    Patient ID: Barrera Ruiz is a 55 y.o. male who presents for Obesity.    Pt is here for Follow Up appointment.     Referring Provider: Roberta Goldman M*  PCP: Roberta Goldman MD PhD  Last visit with PCP: 6/5/2025   Next visit with PCP: 10/6/2025    Subjective     WEIGHT LOSS  BMI Readings from Last 3 Encounters:   06/05/25 36.71 kg/m²   01/13/25 36.54 kg/m²   03/12/24 38.63 kg/m²      Starting weight: 263 lbs. (6/5/2025)  Current TH weight: 260 lbs (Jose weight); Varies between 260-270 lbs  Day 1 of Zepbound: 258.6 lbs   Current TH weight: 250.4 lbs (7/18/2025)    Weight Goals  Next goal: Weight -5% (13 lbs.)  Maintenance BMI Goal: 23 to 29.9 reasonable due to age  Patient defined goal(s):   Goal is to lose 20-30 lbs to become more regular in gym participation     Since starting Zepbound:   He mentions that he has cut out certain foods to improve his tolerability to therapy - I.e. chipotle, chicken sandwich from [employer], reduced consumption of pizza slices  He has noticed that his appetite is reduced and will remind himself to eat at periods  He mentions prior to being on tirzepatide that if he were to go periods of time without eating that he would start to feel lightheaded, weakness or shaking  He suspects the initiation of tirzepatide has helped him to maintain satiety.   At baseline he mentions that he does not have as frequent cravings and desire to eat.   He does not feel to full to eat  Exercise:   No formal regimen currently  He mentions that he was painting two rooms in his house   Is limited by: not enough time    Lifestyle  Diet: 2-3 meals/day.   BK: Protein bar  LN: Skips  DN: Protein + carbohydrate   Minimal vegetable consumption   Snacks: Bowl of cereal (rice krispy), aristeo crackers, pretzels  Cookies around the time of holidays  Drinks: Diet coke, crystal light packets mixed with 32 oz of water  Estimates 32-64 fl oz   He does not feel that he eats a  lot of food during the day  He believes the worst food that he consumes is pizza 1-2 times weekly  His wife is generally cooking at home most days of the week (4-5 days)  Has two children at home   Has worked with nutritionist/dietician? No  Has worked with weight management? No  Exercise:   No formal regimen currently  Is limited by: not enough time    Other Potential Contributing Factors  Alcohol use: Average 0 drinks/month  Tobacco use: No  Other drug use: No  Medications that may cause weight gain: No  Mental health: No current concerns  Eating Disorders: Denies Hx of any eating disorder  Comorbidities: Hyperlipidemia , Sleep Apnea, and fatty liver infiltrate    Pertinent PMH Review:  PMH of Pancreatitis: No  PMH of Retinopathy: No  PMH of MTC: No  PMH of MEN2: No    Non-Pharmacological Therapy  Weight loss techniques attempted:  Weight watchers - 10 years    Pharmacological Therapy  Current Medications: Zepbound 2.5 mg: inject 2.5 mg under the skin once weekly on   Clarifications to above regimen: None  Injections completed: 3  Injections remainin  Adverse Effects: Constipation after dose 1 x 3-4 days; Denies nausea, vomiting, diarrhea.   Previous Medications: None     Insurance coverage of weight-loss medications? Yes  Eligible for copay cards/programs? Yes    Medication Estimated Cost  Without Insurance Expected weight loss Patient Risks/Cautions Notes   Wegovy (semaglutide) $499/month   w/ savings card 10-20% No known concerns      Zepbound (tirzepatide) $499/month vials via [employer].  $650/month pens w/ savings card. 15-25%     Saxenda (liraglutide) ~$1300/month 5-15%     Compounded semaglutide + additives $199-300/month Limited data  Not FDA approved      Medication Reconciliation:  Changed: None  Added: None  Discontinued: None    Drug Interactions  No relevant drug interactions were noted.    Medication System Management  Affordability/Accessibility:   Prior authorization  on  "6/30/2025      Objective   Allergies[1]  Social History     Social History Narrative    Not on file      Medication Review  Current Outpatient Medications   Medication Instructions    citalopram (CELEXA) 20 mg, oral, Daily    fluocinolone (Synalar) 0.01 % external solution Topical, 2 times daily    milk thistle/NAC/dandel/turmer (LIVER COMPLEX ORAL) Take by mouth.    omeprazole (PRILOSEC) 20 mg, oral, Daily before breakfast    tirzepatide (weight loss) (ZEPBOUND) 5 mg, subcutaneous, Every 7 days    TURMERIC ORAL Take by mouth.      Vitals  BP Readings from Last 2 Encounters:   06/05/25 120/80   01/13/25 110/60     BMI Readings from Last 1 Encounters:   06/05/25 36.71 kg/m²      Labs  A1C  Lab Results   Component Value Date    HGBA1C 5.5 01/10/2023    HGBA1C 5.6 06/04/2022     BMP  Lab Results   Component Value Date    CALCIUM 9.3 01/13/2025     01/13/2025    K 4.5 01/13/2025    CO2 28 01/13/2025     01/13/2025    BUN 14 01/13/2025    CREATININE 0.83 01/13/2025    EGFR >90 01/13/2025     LFTs  Lab Results   Component Value Date    ALT 72 (H) 01/13/2025    AST 52 (H) 01/13/2025    ALKPHOS 41 01/13/2025    BILITOT 0.5 01/13/2025     FLP  Lab Results   Component Value Date    TRIG 147 01/13/2025    CHOL 178 01/13/2025    LDLF 98 01/10/2023    LDLCALC 106 (H) 01/13/2025    HDL 43.0 01/13/2025     Urine Microalbumin  No results found for: \"MICROALBCREA\"    Weight Management  Wt Readings from Last 3 Encounters:   06/05/25 119 kg (263 lb 3.2 oz)   01/13/25 119 kg (262 lb)   03/12/24 126 kg (277 lb)      There is no height or weight on file to calculate BMI.     Assessment/Plan   Problem List Items Addressed This Visit       BARBI on CPAP    Current regimen includes Zepbound 2.5 mg once weekly, three injections completed to date. Baseline weight prior to injection was reported at 258.6 lbs per patient. His current TH weight is reported at 250.4 lbs, 8.2 lbs down from his home baseline and 12.6 lbs down from his " last office visit weight. He reports appropriate tolerability of medication therapy to date with the only reported adverse effect of constipation after his initial injection. We discussed targeting 150 minutes of moderate physical activity weekly in combination with dietary efforts of 64 fl oz of water daily and 25 g of dietary fiber to maintain bowel regularity. Based on his tolerability, efficacy of therapy, and goal weight loss target I advised a dosage increase to 5 mg once weekly. Updated prescription sent to [facility]. Follow up in 1 month.    Zepbound was covered under a prior authorization through his current prescription benefit through 6/30/2025. He has a deductible that must be met prior to coverage by his pharmaceutical benefit. He is aware of this. A new authorization will be submitted following today's appointment. He does not have any contraindications to the use of GLP1 or GLP1/GIP therapy including a personal or family history of pancreatitis, MTC, or MEN type two.      Medication Changes:  START  Zepbound 2.5 mg once weekly     Monitoring and Education:  Counseled patient on relevant medication mechanisms of action, expectations, side effects, duration of therapy, contraindications, administration, and monitoring parameters.  All questions and concerns addressed. Contact pharmacist with any further questions or concerns prior to next appointment.            Relevant Medications    tirzepatide, weight loss, (Zepbound) 5 mg/0.5 mL injection    Other Relevant Orders    Referral to Clinical Pharmacy     Other Visit Diagnoses         Class 2 severe obesity due to excess calories with serious comorbidity and body mass index (BMI) of 36.0 to 36.9 in adult    -  Primary    Relevant Medications    tirzepatide, weight loss, (Zepbound) 5 mg/0.5 mL injection    Other Relevant Orders    Referral to Clinical Pharmacy          Clinical Pharmacist follow-up: 8/14/2025 at 1 pm;  Telehealth visit  Patient is not  followed in CCM.     Continue all meds under the continuation of care with the referring provider and clinical pharmacy team.    Thank you,  Berlin Burroughs, PharmD    Clinical Pharmacist  [phone]    Verbal consent to manage patient's drug therapy was obtained from the patient. They were informed they may decline to participate or withdraw from participation in pharmacy services at any time.         [1] No Known Allergies

## 2025-07-18 NOTE — ASSESSMENT & PLAN NOTE
Current regimen includes Zepbound 2.5 mg once weekly, three injections completed to date. Baseline weight prior to injection was reported at 258.6 lbs per patient. His current TH weight is reported at 250.4 lbs, 8.2 lbs down from his home baseline and 12.6 lbs down from his last office visit weight. He reports appropriate tolerability of medication therapy to date with the only reported adverse effect of constipation after his initial injection. We discussed targeting 150 minutes of moderate physical activity weekly in combination with dietary efforts of 64 fl oz of water daily and 25 g of dietary fiber to maintain bowel regularity. Based on his tolerability, efficacy of therapy, and goal weight loss target I advised a dosage increase to 5 mg once weekly. Updated prescription sent to [facility]. Follow up in 1 month.    Zepbound was covered under a prior authorization through his current prescription benefit through 6/30/2025. He has a deductible that must be met prior to coverage by his pharmaceutical benefit. He is aware of this. A new authorization will be submitted following today's appointment. He does not have any contraindications to the use of GLP1 or GLP1/GIP therapy including a personal or family history of pancreatitis, MTC, or MEN type two.      Medication Changes:  START  Zepbound 2.5 mg once weekly     Monitoring and Education:  Counseled patient on relevant medication mechanisms of action, expectations, side effects, duration of therapy, contraindications, administration, and monitoring parameters.  All questions and concerns addressed. Contact pharmacist with any further questions or concerns prior to next appointment.

## 2025-07-21 ENCOUNTER — PHARMACY VISIT (OUTPATIENT)
Dept: PHARMACY | Facility: CLINIC | Age: 55
End: 2025-07-21
Payer: MEDICARE

## 2025-07-21 PROCEDURE — RXMED WILLOW AMBULATORY MEDICATION CHARGE

## 2025-07-21 NOTE — PROGRESS NOTES
Prescription formulary request was received from this patient's pharmaceutical benefit. Previously approved for the use of Zepbound, however insurance now requires alternative coverage with [employer]. An updated prescription will be required at next pharmacy visit. evocatal message sent to patient.

## 2025-08-14 ENCOUNTER — APPOINTMENT (OUTPATIENT)
Dept: PHARMACY | Facility: HOSPITAL | Age: 55
End: 2025-08-14
Payer: COMMERCIAL

## 2025-08-14 DIAGNOSIS — E66.812 CLASS 2 SEVERE OBESITY DUE TO EXCESS CALORIES WITH SERIOUS COMORBIDITY AND BODY MASS INDEX (BMI) OF 36.0 TO 36.9 IN ADULT: ICD-10-CM

## 2025-08-14 DIAGNOSIS — G47.33 OSA ON CPAP: ICD-10-CM

## 2025-08-14 DIAGNOSIS — E66.01 CLASS 2 SEVERE OBESITY DUE TO EXCESS CALORIES WITH SERIOUS COMORBIDITY AND BODY MASS INDEX (BMI) OF 36.0 TO 36.9 IN ADULT: ICD-10-CM

## 2025-08-14 PROCEDURE — RXMED WILLOW AMBULATORY MEDICATION CHARGE

## 2025-08-15 ENCOUNTER — PHARMACY VISIT (OUTPATIENT)
Dept: PHARMACY | Facility: CLINIC | Age: 55
End: 2025-08-15
Payer: MEDICARE

## 2025-08-25 DIAGNOSIS — F41.9 ANXIETY: ICD-10-CM

## 2025-08-25 RX ORDER — CITALOPRAM 20 MG/1
20 TABLET ORAL DAILY
Qty: 90 TABLET | Refills: 2 | Status: SHIPPED | OUTPATIENT
Start: 2025-08-25

## 2025-09-11 ENCOUNTER — APPOINTMENT (OUTPATIENT)
Dept: PHARMACY | Facility: HOSPITAL | Age: 55
End: 2025-09-11
Payer: COMMERCIAL

## 2025-10-06 ENCOUNTER — APPOINTMENT (OUTPATIENT)
Dept: PRIMARY CARE | Facility: CLINIC | Age: 55
End: 2025-10-06
Payer: COMMERCIAL

## 2026-01-13 ENCOUNTER — APPOINTMENT (OUTPATIENT)
Dept: PRIMARY CARE | Facility: CLINIC | Age: 56
End: 2026-01-13
Payer: COMMERCIAL